# Patient Record
Sex: FEMALE
[De-identification: names, ages, dates, MRNs, and addresses within clinical notes are randomized per-mention and may not be internally consistent; named-entity substitution may affect disease eponyms.]

---

## 2017-10-16 ENCOUNTER — HOSPITAL ENCOUNTER (EMERGENCY)
Dept: HOSPITAL 42 - ED | Age: 57
Discharge: HOME | End: 2017-10-16
Payer: COMMERCIAL

## 2017-10-16 VITALS — TEMPERATURE: 97.7 F

## 2017-10-16 VITALS — HEART RATE: 80 BPM | OXYGEN SATURATION: 99 % | RESPIRATION RATE: 16 BRPM

## 2017-10-16 VITALS — SYSTOLIC BLOOD PRESSURE: 130 MMHG | DIASTOLIC BLOOD PRESSURE: 88 MMHG

## 2017-10-16 VITALS — BODY MASS INDEX: 19.1 KG/M2

## 2017-10-16 DIAGNOSIS — M54.9: Primary | ICD-10-CM

## 2017-10-16 LAB
ALBUMIN/GLOB SERPL: 1.8 {RATIO} (ref 1.1–1.8)
ALP SERPL-CCNC: 58 U/L (ref 38–126)
ALT SERPL-CCNC: 31 U/L (ref 7–56)
APPEARANCE UR: CLEAR
AST SERPL-CCNC: 26 U/L (ref 14–36)
BASOPHILS # BLD AUTO: 0.02 K/MM3 (ref 0–2)
BASOPHILS NFR BLD: 0.4 % (ref 0–3)
BILIRUB SERPL-MCNC: 0.7 MG/DL (ref 0.2–1.3)
BILIRUB UR-MCNC: NEGATIVE MG/DL
BUN SERPL-MCNC: 13 MG/DL (ref 7–21)
CALCIUM SERPL-MCNC: 8.8 MG/DL (ref 8.4–10.5)
CHLORIDE SERPL-SCNC: 109 MMOL/L (ref 98–107)
CO2 SERPL-SCNC: 27 MMOL/L (ref 21–33)
COLOR UR: YELLOW
EOSINOPHIL # BLD: 0.2 10*3/UL (ref 0–0.7)
EOSINOPHIL NFR BLD: 4.5 % (ref 1.5–5)
ERYTHROCYTE [DISTWIDTH] IN BLOOD BY AUTOMATED COUNT: 12.7 % (ref 11.5–14.5)
GLOBULIN SER-MCNC: 2.3 GM/DL
GLUCOSE SERPL-MCNC: 98 MG/DL (ref 70–110)
GLUCOSE UR STRIP-MCNC: NEGATIVE MG/DL
GRANULOCYTES # BLD: 3.02 10*3/UL (ref 1.4–6.5)
GRANULOCYTES NFR BLD: 59.2 % (ref 50–68)
HCT VFR BLD CALC: 41.3 % (ref 36–48)
KETONES UR STRIP-MCNC: NEGATIVE MG/DL
LEUKOCYTE ESTERASE UR-ACNC: NEGATIVE LEU/UL
LYMPHOCYTES # BLD: 1.6 10*3/UL (ref 1.2–3.4)
LYMPHOCYTES NFR BLD AUTO: 31 % (ref 22–35)
MCH RBC QN AUTO: 29.8 PG (ref 25–35)
MCHC RBC AUTO-ENTMCNC: 33.2 G/DL (ref 31–37)
MCV RBC AUTO: 90 FL (ref 80–105)
MONOCYTES # BLD AUTO: 0.3 10*3/UL (ref 0.1–0.6)
MONOCYTES NFR BLD: 4.9 % (ref 1–6)
PH UR STRIP: 6.5 [PH] (ref 4.7–8)
PLATELET # BLD: 201 10^3/UL (ref 120–450)
PMV BLD AUTO: 9.4 FL (ref 7–11)
POTASSIUM SERPL-SCNC: 3.9 MMOL/L (ref 3.6–5)
PROT SERPL-MCNC: 6.4 G/DL (ref 5.8–8.3)
PROT UR STRIP-MCNC: NEGATIVE MG/DL
RBC # UR STRIP: NEGATIVE /UL
SODIUM SERPL-SCNC: 144 MMOL/L (ref 132–148)
SP GR UR STRIP: <= 1.005 (ref 1–1.03)
TROPONIN I SERPL-MCNC: < 0.01 NG/ML
UROBILINOGEN UR STRIP-ACNC: 0.2 E.U./DL
WBC # BLD AUTO: 5.1 10^3/UL (ref 4.5–11)

## 2017-10-16 PROCEDURE — 85378 FIBRIN DEGRADE SEMIQUANT: CPT

## 2017-10-16 PROCEDURE — 99284 EMERGENCY DEPT VISIT MOD MDM: CPT

## 2017-10-16 PROCEDURE — 81003 URINALYSIS AUTO W/O SCOPE: CPT

## 2017-10-16 PROCEDURE — 80053 COMPREHEN METABOLIC PANEL: CPT

## 2017-10-16 PROCEDURE — 96374 THER/PROPH/DIAG INJ IV PUSH: CPT

## 2017-10-16 PROCEDURE — 85025 COMPLETE CBC W/AUTO DIFF WBC: CPT

## 2017-10-16 PROCEDURE — 71020: CPT

## 2017-10-16 PROCEDURE — 84484 ASSAY OF TROPONIN QUANT: CPT

## 2017-10-16 PROCEDURE — 93005 ELECTROCARDIOGRAM TRACING: CPT

## 2017-10-16 NOTE — ED PDOC
Arrival/HPI





- General


Time Seen by Provider: 10/16/17 06:26





- History of Present Illness


Narrative History of Present Illness (Text): 





10/16/17 06:26


pt present complaining of  left flank pain, chills no dysuria or cough  or 

abdominal pain denies cp or sob


Time/Duration: < week (3 days)


Symptom Course: Worsening





Past Medical History





- Provider Review


Nursing Documentation Reviewed: Yes





- Travel History


Have you recently traveled outside [place] w/in the past 3 mons?: No





Family/Social History





- Physician Review


Nursing Documentation Reviewed: Yes


Family/Social History: No Known Family HX





Allergies/Home Meds


Allergies/Adverse Reactions: 


Allergies





No Known Allergies Allergy (Verified 10/16/17 06:26)


 








Home Medications: 


 Home Meds











 Medication  Instructions  Recorded  Confirmed


 


Conjugated Estrogens [Premarin 1 appl TD DAILY 10/16/17 10/16/17





Vaginal Cream]   


 


Progesterone,Micronized 100 mg PO DAILY 10/16/17 10/16/17





[Prometrium]   














Review of Systems





- Review of Systems


Constitutional: Fevers.  absent: Night Sweats


Eyes: Normal


ENT: Normal


Respiratory: SOB


Cardiovascular: Normal


Gastrointestinal: absent: Abdominal Pain, Diarrhea, Nausea, Vomiting


Genitourinary Female: Normal


Musculoskeletal: Back Pain


Skin: Normal


Neurological: Normal


Endocrine: Normal


Hemo/Lymphatic: Normal


Psychiatric: Normal





Physical Exam


Vital Signs Reviewed: Yes


Vital Signs











  Temp Pulse Resp BP Pulse Ox


 


 10/16/17 08:14   80  16   99


 


 10/16/17 06:41     130/88 


 


 10/16/17 06:36  97.7 F  84  18  134/100 H  100











Temperature: Afebrile


Blood Pressure: Normal


Pulse: Regular


Respiratory Rate: Normal


Appearance: Positive for: Well-Appearing, Non-Toxic, Comfortable


Pain Distress: None


Mental Status: Positive for: Alert and Oriented X 3





- Systems Exam


Head: Present: Atraumatic, Normocephalic


Pupils: Present: PERRL


Extroacular Muscles: Present: EOMI


Conjunctiva: Present: Normal


Mouth: Present: Moist Mucous Membranes


Neck: Present: Normal Range of Motion


Respiratory/Chest: Present: Clear to Auscultation, Good Air Exchange.  No: 

Respiratory Distress, Accessory Muscle Use


Cardiovascular: Present: Regular Rate and Rhythm, Normal S1, S2.  No: Murmurs


Abdomen: Present: Normal Bowel Sounds.  No: Tenderness, Distention, Peritoneal 

Signs


Back: Present: Normal Inspection


Upper Extremity: Present: Normal Inspection.  No: Cyanosis, Edema


Lower Extremity: Present: Normal Inspection.  No: Edema


Neurological: Present: GCS=15, CN II-XII Intact, Speech Normal


Skin: Present: Warm, Dry, Normal Color.  No: Rashes


Psychiatric: Present: Alert, Oriented x 3, Normal Insight, Normal Concentration





Medical Decision Making





- Lab Interpretations


Lab Results: 








 10/16/17 07:17 





 10/16/17 07:17 





 Lab Results





10/16/17 07:35: Urine Color Yellow, Urine Appearance Clear, Urine pH 6.5, Ur 

Specific Gravity <= 1.005, Urine Protein Negative, Urine Glucose (UA) Negative, 

Urine Ketones Negative, Urine Blood Negative, Urine Nitrate Negative, Urine 

Bilirubin Negative, Urine Urobilinogen 0.2, Ur Leukocyte Esterase Negative


10/16/17 07:17: D-Dimer, Quantitative 0.31


10/16/17 07:17: Sodium 144, Potassium 3.9, Chloride 109 H, Carbon Dioxide 27, 

Anion Gap 12, BUN 13, Creatinine 0.7, Est GFR (African Amer) > 60, Est GFR (Non-

Af Amer) > 60, Random Glucose 98, Calcium 8.8, Total Bilirubin 0.7, AST 26, ALT 

31, Alkaline Phosphatase 58, Troponin I < 0.01, Total Protein 6.4, Albumin 4.1, 

Globulin 2.3, Albumin/Globulin Ratio 1.8


10/16/17 07:17: WBC 5.1  D, RBC 4.59, Hgb 13.7, Hct 41.3, MCV 90.0, MCH 29.8, 

MCHC 33.2, RDW 12.7, Plt Count 201, MPV 9.4, Gran % 59.2, Lymph % (Auto) 31.0, 

Mono % (Auto) 4.9, Eos % (Auto) 4.5, Baso % (Auto) 0.4, Gran # 3.02, Lymph # 1.6

, Mono # 0.3, Eos # 0.2, Baso # 0.02











- RAD Interpretation


Radiology Orders: 








10/16/17 06:40


CHEST TWO VIEWS (PA/LAT) [RAD] Stat 














- Medication Orders


Current Medication Orders: 











Discontinued Medications





Ketorolac Tromethamine (Toradol)  10 mg IVP STAT STA


   Stop: 10/16/17 07:09


   Last Admin: 10/16/17 07:30  Dose: 10 mg





MAR Pain Assessment


 Document     10/16/17 07:30  MD  (Rec: 10/16/17 07:30  MD  [id number])


     Pain Reassessment


      Is this a pain reassessment?               Yes


     Sleep


      Is patient sleeping during reassessment?   No


     Presence of Pain


      Presence of Pain                           Yes


IVP Administration


 Document     10/16/17 07:30  MD  (Rec: 10/16/17 07:30  MD  [id number])


     Charges for Administration


      # of IVP Administrations                   1














- Transfer of Care


Patient signed out to Dr:: keli labs and dispo





Disposition/Present on Arrival





- Present on Arrival


Any Indicators Present on Arrival: No





- Disposition


Have Diagnosis and Disposition been Completed?: Yes


Diagnosis: 


 Back pain





Disposition: HOME/ ROUTINE


Disposition Time: 07:00


Condition: IMPROVED


Additional Instructions: 


Please follow up with your doctor. Return to the ER for any worsening symptoms 

or for any other concerns. 


Referrals: 


Pablo Preston MD [Family Provider] - Follow up with primary


Forms:  [employer] (English)

## 2017-10-16 NOTE — RAD
HISTORY:

fall  



COMPARISON:

No prior.



TECHNIQUE:

Chest PA and lateral



FINDINGS:



LUNGS:

No infiltrate.  Lungs are mildly hyperinflated with increase in the 

A-P diameter and increased width of the retrosternal clear space.



PLEURA:

No significant pleural effusion identified. No pneumothorax apparent.



CARDIOVASCULAR:

Normal.



OSSEOUS STRUCTURES:

No significant abnormalities.



VISUALIZED UPPER ABDOMEN:

Normal.



OTHER FINDINGS:

None.



IMPRESSION:

No infiltrate.  Pulmonary hyperinflation suggestive of possible 

emphysema.

## 2017-10-16 NOTE — CARD
--------------- APPROVED REPORT --------------





EKG Measurement

Heart [id number]

ME [id number]

[id number]

[id number]

[id number]



<Conclusion>

Normal sinus rhythm

Normal ECG

## 2018-10-17 ENCOUNTER — HOSPITAL ENCOUNTER (EMERGENCY)
Dept: HOSPITAL 42 - ED | Age: 58
Discharge: HOME | End: 2018-10-17
Payer: COMMERCIAL

## 2018-10-17 VITALS — BODY MASS INDEX: 19.1 KG/M2

## 2018-10-17 VITALS — HEART RATE: 72 BPM | OXYGEN SATURATION: 99 %

## 2018-10-17 VITALS — SYSTOLIC BLOOD PRESSURE: 141 MMHG | RESPIRATION RATE: 18 BRPM | TEMPERATURE: 97.9 F | DIASTOLIC BLOOD PRESSURE: 94 MMHG
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
## 2018-10-17 DIAGNOSIS — S92.524A: Primary | ICD-10-CM
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
## 2018-10-17 NOTE — RAD
Date of service: 



10/17/2018



PROCEDURE:  Right Foot Radiographs.



HISTORY:

 trauma f/o fx 



COMPARISON:

None.



FINDINGS:



BONES:

On the lateral view a nondisplaced fracture can be seen in the 5th 

middle phalanx. 



JOINTS:

Normal. 



SOFT TISSUES:

Normal. 



OTHER FINDINGS:

None.



IMPRESSION:

Nondisplaced fracture of the 5th middle phalanx

## 2018-10-17 NOTE — ED PDOC
Arrival/HPI





- General


Chief Complaint: Lower Extremity Problem/Injury


Time Seen by Provider: 10/17/18 08:58





- History of Present Illness


Narrative History of Present Illness (Text): 





10/17/18 09:33


57 yo presents to the ED c/o right foot pain x 1 week. Patient states that about

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 

foot pain. She's been in pain since especially her 5th toe. She noticed swelling

and bruising to her right foot. Walking makes it worse. No numbness or weakness.

No ankle pain. No other trauma.





PMD: No PMD.





Past Medical History





- Provider Review


Nursing Documentation Reviewed: Yes





- Reproductive


Menopause: Yes





- Psychiatric


Hx Psychophysiologic Disorder: No


Hx Substance Use: No





- Surgical History


Hx Breast Biopsy: Yes





- Anesthesia


Hx Anesthesia: No





Family/Social History





- Physician Review


Nursing Documentation Reviewed: Yes


Family/Social History: No Known Family HX


Smoking Status: Never Smoked


Hx Alcohol Use: Yes


Frequency of alcohol use: Socially


Hx Substance Use: No





Allergies/Home Meds


Allergies/Adverse Reactions: 


Allergies





No Known Allergies Allergy (Verified 10/17/18 09:04)


   








Home Medications: 


                                    Home Meds











 Medication  Instructions  Recorded  Confirmed


 


Progesterone,Micronized 100 mg PO DAILY 10/16/17 10/17/18





[Prometrium]   














Review of Systems





- Review of Systems


Constitutional: Normal


Eyes: Normal


ENT: Normal


Respiratory: Normal


Cardiovascular: Normal


Gastrointestinal: Normal


Genitourinary Female: Normal


Musculoskeletal: Other (Right foot and right 5th toe pain)


Skin: Normal


Neurological: Normal


Endocrine: Normal


Hemo/Lymphatic: Normal


Psychiatric: Normal





Physical Exam


Vital Signs Reviewed: Yes





Vital Signs











  Temp Pulse Resp BP Pulse Ox


 


 10/17/18 09:02  97.9 F  87  18  141/94 H  100











Temperature: Afebrile


Blood Pressure: Hypertensive


Pulse: Regular


Respiratory Rate: Normal


Appearance: Positive for: Well-Appearing, Non-Toxic, Comfortable


Pain Distress: None


Mental Status: Positive for: Alert and Oriented X 3





- Systems Exam


Head: Present: Atraumatic, Normocephalic


Lower Extremity: Present: NORMAL PULSES, Normal ROM, Tenderness (doral mid foot 

tenderness closer to 5th toe. Tenderness to 5th toe. Nail bed intact.), 

Neurovascularly Intact, Capillary Refill < 2 s


Neurological: Present: GCS=15, CN II-XII Intact, Speech Normal, Motor Func 

Grossly Intact, Normal Sensory Function


Psychiatric: Present: Alert, Oriented x 3, Normal Insight, Normal Concentration





Medical Decision Making


ED Course and Treatment: 





10/17/2018 09:37


Right Foot X-Ray


IMPRESSION: Nondisplaced fracture of the 5th middle phalanx.


Dictator: Flo Marion MD








10/17/18 10:12


57 yo female with right foot pain especially 5th toe r/o contusion r/o fracture


-- Xray shows fx as noted above


-- Case discussed with Dr. Rizo, podiatry, who recommend eldon tape and a Cam 

Walker for support.


-- He recommends follow in one week. She will follow up with [facility] and

 I also placed information for follow up with Podiatry, Dr. Winters. 





- RAD Interpretation


Radiology Orders: 











10/17/18 09:05


FOOT RIGHT 3 VIEWS ROUTINE [RAD] Stat 














Disposition/Present on Arrival





- Present on Arrival


Any Indicators Present on Arrival: No


History of DVT/PE: No


History of Uncontrolled Diabetes: No


Urinary Catheter: No


History of Decub. Ulcer: No


History Surgical Site Infection Following: None





- Disposition


Have Diagnosis and Disposition been Completed?: Yes


Diagnosis: 


 Fracture of fifth toe, right, closed





Disposition: HOME/ ROUTINE


Disposition Time: 09:34


Condition: GOOD


Discharge Instructions (ExitCare):  Contusion (DC)


Additional Instructions: 





MARY KAY LÓPEZ, thank you for letting us take care of you today. Your provider

 was Aric Cedeño DO and you were treated for Foot Contusion. The emergency

 medical care you received today was directed at your acute symptoms. If you 

were prescribed any medication, please fill it and take as directed. It may take

 several days for your symptoms to resolve. Return to the Emergency Department 

if your symptoms worsen, do not improve, or if you have any other problems.





[employer] [employer] Employee





Regarding your Work Related Injury, you are instructed to do all of the 

following by next day:





1. Notify [employer] [facility] Department of the sustained 

injury and arrange for any follow-up appointments if needed during the next 

business day. If the office is closed or no answer is received, please leave a 

detailed voice message. Message should include your full name, department and 

manager, date of injury, date of ED visit if applicable. [employer] [facility] can be 

reached at [phone].





2. If there is time lost, notify [employer] [facility] Resources 

Department of the work related injury the next business day at [phone]. 





Thank you for allowing the [facility] team to be part of your care today.








If you had an X-Ray or CT scan: A Radiologist will review the ED reading if any 

change in treatment is needed we will contact you.***





If you had a blood, urine, or wound culture: It will take several days for the 

results, if any change in treatment is needed we will contact you.***





If you had an STI test: It will take 48 hours for the results. Please call after

 1 week if you have not heard back.***


Prescriptions: 


Ibuprofen [Motrin] 600 mg PO Q6 PRN #30 tab


 PRN Reason: Pain, Moderate (4-7)


Referrals: 


Angeles Winters DPM [Staff Provider] - Follow up with primary


Forms:  [employer] (English), WORK NOTE

## 2018-11-15 ENCOUNTER — HOSPITAL ENCOUNTER (EMERGENCY)
Dept: HOSPITAL 42 - ED | Age: 58
Discharge: HOME | End: 2018-11-15
Payer: COMMERCIAL

## 2018-11-15 VITALS
RESPIRATION RATE: 18 BRPM | TEMPERATURE: 97.7 F | SYSTOLIC BLOOD PRESSURE: 132 MMHG | HEART RATE: 67 BPM | OXYGEN SATURATION: 100 % | DIASTOLIC BLOOD PRESSURE: 91 MMHG

## 2018-11-15 VITALS — BODY MASS INDEX: 19.1 KG/M2

## 2018-11-15 DIAGNOSIS — H02.89: Primary | ICD-10-CM

## 2018-11-15 NOTE — ED PDOC
Arrival/HPI





- General


Chief Complaint: Eye Problem


Time Seen by Provider: 11/15/18 22:05


Historian: Patient





- History of Present Illness


Narrative History of Present Illness (Text): 





11/15/18 22:41


58-year-old female complaining of swelling to the upper and lower eyelid of the 

right eye which she noticed this evening while she was at work. Otherwise the 

patient reports no eye pain, discharge, redness of the eye, decrease in vision, 

contact lens use, headache, URI, fever, trauma or injury to the eye.





Past Medical History





- Psychiatric


Hx Psychophysiologic Disorder: No


Hx Substance Use: No





- Surgical History


Hx Breast Biopsy: Yes





- Anesthesia


Hx Anesthesia: Yes


Hx Anesthesia Reactions: No


Hx Malignant Hyperthermia: No





Family/Social History


Family/Social History: No Known Family HX


Smoking Status: Never Smoked


Hx Alcohol Use: Yes


Hx Substance Use: No





Allergies/Home Meds


Allergies/Adverse Reactions: 


Allergies





No Known Allergies Allergy (Verified 10/17/18 09:04)


   








Home Medications: 


                                    Home Meds











 Medication  Instructions  Recorded  Confirmed


 


Progesterone,Micronized 100 mg PO DAILY 10/16/17 10/17/18





[Prometrium]   














Review of Systems





- Review of Systems


Constitutional: absent: Fatigue, Fevers


Eyes: Other (eyelid swelling).  absent: Vision Changes, Photophobia, Eye Pain


ENT: absent: Sore Throat, Rhinorrhea, Epistaxis, Sinus Congestion


Skin: absent: Rash, Pruritis, Skin Lesions





Physical Exam





Vital Signs











  Temp Pulse Resp BP Pulse Ox


 


 11/15/18 22:17  97.7 F  67  18  132/91 H  100











Temperature: Afebrile


Blood Pressure: Normal


Pulse: Regular


Respiratory Rate: Normal


Appearance: Positive for: Well-Appearing, Non-Toxic, Comfortable


Pain Distress: None


Mental Status: Positive for: Alert and Oriented X 3





- Systems Exam


Head: Present: Atraumatic, Normocephalic


Pupils: Present: PERRL


Extroacular Muscles: Present: EOMI, Other (+mild edema to the upper and lower R 

eyelids)


Conjunctiva: Present: Normal, Other (no discharge)


Ears: Present: Normal, NORMAL TM, Normal Canal.  No: Erythema


Mouth: Present: Moist Mucous Membranes


Neck: Present: Normal Range of Motion.  No: Meningeal Signs, Lymphadenopathy


Neurological: Present: GCS=15, CN II-XII Intact, Speech Normal, Motor Func 

Grossly Intact


Skin: Present: Warm, Dry, Normal Color.  No: Rashes


Psychiatric: Present: Alert, Oriented x 3, Normal Insight, Normal Concentration





Medical Decision Making


ED Course and Treatment: 





11/15/18 22:43


Advised to follow up with primary care physician in 1-2 days without fail. 

Advised to take otc zyrtec for her symptoms. Return to the emergency room at any

 time for any new or worsening symptoms.





Patient states she fully agrees with and understands discharge instructions. 

States that she agrees with the plan and disposition. Verbalized and repeated 

discharge instructions and plan. I have given the patient opportunity to ask any

 additional questions.





- PA / NP / Resident Statement


MD/DO has reviewed & agrees with the documentation as recorded.





Disposition/Present on Arrival





- Present on Arrival


Any Indicators Present on Arrival: No


History of DVT/PE: No


History of Uncontrolled Diabetes: No


Urinary Catheter: No


History of Decub. Ulcer: No


History Surgical Site Infection Following: None





- Disposition


Have Diagnosis and Disposition been Completed?: No


Diagnosis: 


 Swelling of eyelid





Disposition: HOME/ ROUTINE


Disposition Time: 22:30


Patient Plan: Discharge


Patient Problems: 


                             Current Active Problems











Problem Status Onset


 


Swelling of eyelid Acute 











Condition: STABLE


Discharge Instructions (ExitCare):  Contact Dermatitis (DC)


Additional Instructions: 


Thank you for letting us take care of you today. You were treated for swelling 

to eyelid, likely allergic reaction. The emergency medical care you received 

today was directed at your acute symptoms. Take over the counter zyrtec for your

 symptoms. It may take several days for your symptoms to resolve. Return to the 

Emergency Department if your symptoms worsen, do not improve, or if you have any

 other problems.





Please contact your doctor in 2 days for re-evaluation and follow up. Bring any 

paperwork you were given at discharge with you along with any medications you 

are taking to your follow up visit. Our treatment cannot replace ongoing medical

 care by a primary care provider (PCP) outside of the emergency department.





Thank you for allowing the [facility] team to be part of your care today.








Forms:  [employer] (English)